# Patient Record
Sex: MALE | Race: ASIAN | NOT HISPANIC OR LATINO | URBAN - METROPOLITAN AREA
[De-identification: names, ages, dates, MRNs, and addresses within clinical notes are randomized per-mention and may not be internally consistent; named-entity substitution may affect disease eponyms.]

---

## 2018-07-31 ENCOUNTER — OFFICE VISIT - HEALTHEAST (OUTPATIENT)
Dept: FAMILY MEDICINE | Facility: CLINIC | Age: 18
End: 2018-07-31

## 2018-07-31 DIAGNOSIS — Z00.00 HEALTHCARE MAINTENANCE: ICD-10-CM

## 2018-07-31 ASSESSMENT — MIFFLIN-ST. JEOR: SCORE: 1558.05

## 2018-08-03 LAB
GAMMA INTERFERON BACKGROUND BLD IA-ACNC: 0.16 IU/ML
M TB IFN-G BLD-IMP: NEGATIVE
MITOGEN IGNF BCKGRD COR BLD-ACNC: 0 IU/ML
MITOGEN IGNF BCKGRD COR BLD-ACNC: 0.03 IU/ML
QTF INTERPRETATION: NORMAL
QTF MITOGEN - NIL: 9.72 IU/ML

## 2018-08-22 ENCOUNTER — COMMUNICATION - HEALTHEAST (OUTPATIENT)
Dept: FAMILY MEDICINE | Facility: CLINIC | Age: 18
End: 2018-08-22

## 2021-06-01 VITALS — WEIGHT: 150.75 LBS | HEIGHT: 62 IN | BODY MASS INDEX: 27.74 KG/M2

## 2021-06-19 NOTE — PROGRESS NOTES
Subjective:   Nithya Norman presents with an  today.  He comes in for tuberculosis screening.  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  He has never had a positive tuberculosis screen in the past.  He does not know of any exposures to active tuberculosis that he has had at home or with his work.  He denies fevers.  He denies cough.  He denies chills.  He has no rash.      Objective:  HEENT: all within normal limits  Neck: No lymphadenopathy  Lungs: Lungs are totally clear bilaterally.  Patient is in no respiratory distress.      Assessment:  1.  Healthcare maintenance with tuberculosis screening      Plan:  QuantiFERON gold testing will be done today.  Forms will be completed once we have results.  They will be faxed to [facility] at [phone].  He will follow-up here as needed.